# Patient Record
Sex: FEMALE
[De-identification: names, ages, dates, MRNs, and addresses within clinical notes are randomized per-mention and may not be internally consistent; named-entity substitution may affect disease eponyms.]

---

## 2023-02-07 ENCOUNTER — RX ONLY (OUTPATIENT)
Age: 38
Setting detail: RX ONLY
End: 2023-02-07

## 2023-02-10 ENCOUNTER — APPOINTMENT (RX ONLY)
Dept: URBAN - METROPOLITAN AREA CLINIC 153 | Facility: CLINIC | Age: 38
Setting detail: DERMATOLOGY
End: 2023-02-10

## 2023-02-10 PROBLEM — Z41.8 ENCOUNTER FOR OTHER PROCEDURES FOR PURPOSES OTHER THAN REMEDYING HEALTH STATE: Status: ACTIVE | Noted: 2023-02-10

## 2023-02-10 PROCEDURE — ? LASER HAIR REMOVAL

## 2023-02-10 PROCEDURE — ? INVENTORY

## 2023-02-10 NOTE — PROCEDURE: LASER HAIR REMOVAL
Post-Care Instructions: I reviewed with the patient in detail post-care instructions. Patient should avoid sun for a minimum of 4 weeks before and after treatment.
Pulse Duration (Include Units): 40
Location Override: [place]
Anesthesia Type: 1% lidocaine with epinephrine
Spot Size: 5 mm
Treatment Number: 1
Pre-Procedure: Prior to proceeding the treatment areas were cleaned and all present put on their eye protection.
External Cooling Fan Speed: 0
Render Post-Care In The Note: No
Fluence (Will Not Render If 0): 13
Post-Procedure Care: Immediate endpoint: perifollicular erythema and edema. Vaseline and ice applied. Post care reviewed with patient.
Number Of Prepaid Treatments (Will Not Render If 0): 8
Were Eye Shields Employed?: Yes
Fluence (Will Not Render If 0): 14
Fluence (Will Not Render If 0): 12
Detail Level: Detailed
Laser Type: Xeo Kaitlint IPL
Eye Shield Text: Given the treatment area eye shields were inserted prior to treatment.
Shaving (Optional): The patient shaved at home
Consent: Written consent obtained, risks reviewed including but not limited to crusting, scabbing, blistering, scarring, darker or lighter pigmentary change, paradoxical hair regrowth, incomplete removal of hair and infection.

## 2023-02-10 NOTE — HPI: COSMETIC (LASER HAIR REMOVAL)
Have You Had Laser Hair Removal Before?: has not had previous treatment
When Outside In The Sun, Do You...: mildly burns, tans slowly
Additional History: Skin type 2/3

## 2023-03-15 ENCOUNTER — APPOINTMENT (RX ONLY)
Dept: URBAN - METROPOLITAN AREA CLINIC 153 | Facility: CLINIC | Age: 38
Setting detail: DERMATOLOGY
End: 2023-03-15

## 2023-03-15 PROBLEM — Z41.8 ENCOUNTER FOR OTHER PROCEDURES FOR PURPOSES OTHER THAN REMEDYING HEALTH STATE: Status: ACTIVE | Noted: 2023-03-15

## 2023-03-15 PROCEDURE — ? LASER HAIR REMOVAL

## 2023-03-15 PROCEDURE — ? INVENTORY

## 2023-03-15 NOTE — PROCEDURE: LASER HAIR REMOVAL
Laser Type: Xeo Kaitlint IPL
Pulse Duration (Include Units): 40
Location Override: Brazilian
Number Of Prepaid Treatments (Will Not Render If 0): 0
Post-Care Instructions: I reviewed with the patient in detail post-care instructions. Patient should avoid sun for a minimum of 4 weeks before and after treatment.
Shaving (Optional): The patient shaved at home
Eye Shield Text: Given the treatment area eye shields were inserted prior to treatment.
Fluence (Will Not Render If 0): 14
Fluence (Will Not Render If 0): 13
Consent: Written consent obtained, risks reviewed including but not limited to crusting, scabbing, blistering, scarring, darker or lighter pigmentary change, paradoxical hair regrowth, incomplete removal of hair and infection.
Treatment Number: 2
Post-Procedure Care: Immediate endpoint: perifollicular erythema and edema. Vaseline and ice applied. Post care reviewed with patient.
Number Of Prepaid Treatments (Will Not Render If 0): 8
Spot Size: 5 mm
Were Eye Shields Employed?: Yes
Detail Level: Detailed
Anesthesia Type: 1% lidocaine with epinephrine
Pre-Procedure: Prior to proceeding the treatment areas were cleaned and all present put on their eye protection.
Render Post-Care In The Note: No

## 2023-03-15 NOTE — HPI: COSMETIC (LASER HAIR REMOVAL)
previous_has_had_previous_treatment
When Outside In The Sun, Do You...: mildly burns, tans slowly
Resident
When Was Your Last Laser Treatment?: 2/10/23
Number Of Treatments: 1
Additional History: Skin type 2/3
Resident
Resident
Pt

## 2023-03-30 ENCOUNTER — APPOINTMENT (RX ONLY)
Dept: URBAN - METROPOLITAN AREA CLINIC 153 | Facility: CLINIC | Age: 38
Setting detail: DERMATOLOGY
End: 2023-03-30

## 2023-03-30 DIAGNOSIS — Z41.8 ENCOUNTER FOR OTHER PROCEDURES FOR PURPOSES OTHER THAN REMEDYING HEALTH STATE: ICD-10-CM

## 2023-03-30 PROCEDURE — ? LASER TATTOO REMOVAL

## 2023-03-30 PROCEDURE — ? INVENTORY

## 2023-03-30 ASSESSMENT — LOCATION ZONE DERM: LOCATION ZONE: FINGER

## 2023-03-30 ASSESSMENT — LOCATION DETAILED DESCRIPTION DERM: LOCATION DETAILED: LEFT PROXIMAL DORSAL RING FINGER

## 2023-03-30 ASSESSMENT — LOCATION SIMPLE DESCRIPTION DERM: LOCATION SIMPLE: LEFT RING FINGER

## 2023-03-30 NOTE — HPI: COSMETIC (LASER TATTOO REMOVAL)
Have You Had Laser Tattoo Removal Before?: has not had previous treatments
When Outside In The Sun, Do You...: rarely burns, mostly tans
Additional History: Skin type 3

## 2023-03-30 NOTE — PROCEDURE: LASER TATTOO REMOVAL
External Cooling Fan Speed: 0
Energy Percentage (Optional): 2
Post-Care Instructions: I reviewed with the patient in detail post-care instructions. Patient should apply vaseline twice daily to tattoo and keep it covered with a non-stick adhesive dressing.
Laser Type: Q-switched Nd:Yag 532nm
Endpoint Text: Immediate endpoint: skin whitening and pinpoint bleeding.
Treatment Number: 1
Detail Level: Detailed
Tattoo Color Treated: Red
Laser Type: Q-switched Alexandrite 755nm
Pre-Procedure Text: The treatment areas were cleaned and treated with the laser parameters noted above.
Pulse Width (Optional - Include Units): 700J
Anesthesia Type: 1% lidocaine with epinephrine
Post-Procedure Text: Vaseline and ice applied. Post care reviewed with patient.
Consent: Written consent obtained, risks reviewed including but not limited to crusting, scabbing, blistering, scarring, darker or lighter pigmentary change, systemic reactions, ulceration, incidental hair removal, bruising, and/or incomplete removal.
Spot Size In Mm: 3

## 2023-04-27 ENCOUNTER — APPOINTMENT (RX ONLY)
Dept: URBAN - METROPOLITAN AREA CLINIC 153 | Facility: CLINIC | Age: 38
Setting detail: DERMATOLOGY
End: 2023-04-27

## 2023-04-27 DIAGNOSIS — Z41.8 ENCOUNTER FOR OTHER PROCEDURES FOR PURPOSES OTHER THAN REMEDYING HEALTH STATE: ICD-10-CM

## 2023-04-27 PROCEDURE — ? LASER HAIR REMOVAL

## 2023-04-27 PROCEDURE — ? INVENTORY

## 2023-04-27 ASSESSMENT — LOCATION DETAILED DESCRIPTION DERM: LOCATION DETAILED: LEFT SUPRAPUBIC SKIN

## 2023-04-27 ASSESSMENT — LOCATION ZONE DERM: LOCATION ZONE: TRUNK

## 2023-04-27 ASSESSMENT — LOCATION SIMPLE DESCRIPTION DERM: LOCATION SIMPLE: GROIN

## 2023-04-27 NOTE — HPI: COSMETIC (LASER HAIR REMOVAL)
previous_has_had_previous_treatment
When Outside In The Sun, Do You...: rarely burns, tans with ease
When Was Your Last Laser Treatment?: 3/15/23
Number Of Treatments: 2
Additional History: Skin type 2/3

## 2023-04-27 NOTE — PROCEDURE: LASER HAIR REMOVAL
Pulse Duration (Include Units): 40
External Cooling Fan Speed: 0
Pre-Procedure: Prior to proceeding the treatment areas were cleaned and all present put on their eye protection.
Treatment Number: 3
Eye Shield Text: Given the treatment area eye shields were inserted prior to treatment.
Spot Size: 5 mm
Fluence (Will Not Render If 0): 14
Were Eye Shields Employed?: Yes
Post-Procedure Care: Immediate endpoint: perifollicular erythema and edema. Vaseline and ice applied. Post care reviewed with patient.
Number Of Prepaid Treatments (Will Not Render If 0): 8
Consent: Written consent obtained, risks reviewed including but not limited to crusting, scabbing, blistering, scarring, darker or lighter pigmentary change, paradoxical hair regrowth, incomplete removal of hair and infection.
Laser Type: Xeo Kaitlint IPL
Total Area (Optional- Include Units): 1
Post-Care Instructions: I reviewed with the patient in detail post-care instructions. Patient should avoid sun for a minimum of 4 weeks before and after treatment.
Shaving (Optional): The patient shaved at home
Anesthesia Type: 1% lidocaine with epinephrine
Location Override: Brazilian
Render Post-Care In The Note: No
Detail Level: Detailed
Fluence (Will Not Render If 0): 13

## 2023-06-23 ENCOUNTER — APPOINTMENT (RX ONLY)
Dept: URBAN - METROPOLITAN AREA CLINIC 153 | Facility: CLINIC | Age: 38
Setting detail: DERMATOLOGY
End: 2023-06-23

## 2023-06-23 DIAGNOSIS — Z41.8 ENCOUNTER FOR OTHER PROCEDURES FOR PURPOSES OTHER THAN REMEDYING HEALTH STATE: ICD-10-CM

## 2023-06-23 PROCEDURE — ? INVENTORY

## 2023-06-23 PROCEDURE — ? LASER HAIR REMOVAL

## 2023-06-23 NOTE — HPI: COSMETIC (LASER HAIR REMOVAL)
previous_has_had_previous_treatment
When Outside In The Sun, Do You...: mildly burns, tans slowly
When Was Your Last Laser Treatment?: 4/27/23
Number Of Treatments: 3
Additional History: Skin type 2/3

## 2023-06-23 NOTE — PROCEDURE: LASER HAIR REMOVAL
Were Eye Shields Employed?: Yes
Pre-Procedure: Prior to proceeding the treatment areas were cleaned and all present put on their eye protection.
Treatment Number: 4
Anesthesia Type: 1% lidocaine with epinephrine
Spot Size: 5 mm
Pulse Duration (Include Units): 40
Consent: Written consent obtained, risks reviewed including but not limited to crusting, scabbing, blistering, scarring, darker or lighter pigmentary change, paradoxical hair regrowth, incomplete removal of hair and infection.
Post-Procedure Care: Immediate endpoint: perifollicular erythema and edema. Vaseline and ice applied. Post care reviewed with patient.
Detail Level: Detailed
Number Of Prepaid Treatments (Will Not Render If 0): 8
External Cooling Fan Speed: 0
Post-Care Instructions: I reviewed with the patient in detail post-care instructions. Patient should avoid sun for a minimum of 4 weeks before and after treatment.
Eye Shield Text: Given the treatment area eye shields were inserted prior to treatment.
Fluence (Will Not Render If 0): 13
Fluence (Will Not Render If 0): 14
Laser Type: Xeo Kaitlint IPL
Render Post-Care In The Note: No
Fluence (Will Not Render If 0): 15
Shaving (Optional): The patient shaved at home
Location Override: Brazilian

## 2023-10-09 ENCOUNTER — APPOINTMENT (RX ONLY)
Dept: URBAN - METROPOLITAN AREA CLINIC 153 | Facility: CLINIC | Age: 38
Setting detail: DERMATOLOGY
End: 2023-10-09

## 2023-10-09 DIAGNOSIS — L259 CONTACT DERMATITIS AND OTHER ECZEMA, UNSPECIFIED CAUSE: ICD-10-CM

## 2023-10-09 DIAGNOSIS — Z41.8 ENCOUNTER FOR OTHER PROCEDURES FOR PURPOSES OTHER THAN REMEDYING HEALTH STATE: ICD-10-CM

## 2023-10-09 PROBLEM — L23.9 ALLERGIC CONTACT DERMATITIS, UNSPECIFIED CAUSE: Status: ACTIVE | Noted: 2023-10-09

## 2023-10-09 PROCEDURE — ? INVENTORY

## 2023-10-09 PROCEDURE — 99203 OFFICE O/P NEW LOW 30 MIN: CPT

## 2023-10-09 PROCEDURE — ? LASER HAIR REMOVAL

## 2023-10-09 PROCEDURE — ? ADDITIONAL NOTES

## 2023-10-09 PROCEDURE — ? COUNSELING

## 2023-10-09 PROCEDURE — ? PRESCRIPTION

## 2023-10-09 RX ORDER — TRIAMCINOLONE ACETONIDE 0.25 MG/ML
LOTION TOPICAL
Qty: 60 | Refills: 2 | Status: ERX | COMMUNITY
Start: 2023-10-09

## 2023-10-09 RX ADMIN — TRIAMCINOLONE ACETONIDE: 0.25 LOTION TOPICAL at 00:00

## 2023-10-09 ASSESSMENT — LOCATION SIMPLE DESCRIPTION DERM
LOCATION SIMPLE: RIGHT CHEEK
LOCATION SIMPLE: LEFT CHEEK
LOCATION SIMPLE: GROIN
LOCATION SIMPLE: RIGHT THIGH
LOCATION SIMPLE: LEFT FOREHEAD

## 2023-10-09 ASSESSMENT — LOCATION DETAILED DESCRIPTION DERM
LOCATION DETAILED: RIGHT CENTRAL MALAR CHEEK
LOCATION DETAILED: LEFT SUPRAPUBIC SKIN
LOCATION DETAILED: LEFT INFERIOR MEDIAL FOREHEAD
LOCATION DETAILED: LEFT CENTRAL MALAR CHEEK
LOCATION DETAILED: RIGHT ANTERIOR PROXIMAL THIGH

## 2023-10-09 ASSESSMENT — LOCATION ZONE DERM
LOCATION ZONE: TRUNK
LOCATION ZONE: LEG
LOCATION ZONE: FACE

## 2023-10-09 NOTE — PROCEDURE: ADDITIONAL NOTES
Additional Notes:  injected 1.5cc of kenalog 40 on right thigh.
Detail Level: Simple
Render Risk Assessment In Note?: no

## 2023-10-09 NOTE — PROCEDURE: LASER HAIR REMOVAL
External Cooling Fan Speed: 0
Consent: Written consent obtained, risks reviewed including but not limited to crusting, scabbing, blistering, scarring, darker or lighter pigmentary change, paradoxical hair regrowth, incomplete removal of hair and infection.
Were Eye Shields Employed?: Yes
Detail Level: Detailed
Post-Procedure Care: Immediate endpoint: perifollicular erythema and edema. Vaseline and ice applied. Post care reviewed with patient.
Number Of Prepaid Treatments (Will Not Render If 0): 8
Spot Size: 5 mm
Post-Care Instructions: I reviewed with the patient in detail post-care instructions. Patient should avoid sun for a minimum of 4 weeks before and after treatment.
Laser Type: Xeo Kaitlint IPL
Shaving (Optional): The patient shaved at home
Fluence (Will Not Render If 0): 15
Eye Shield Text: Given the treatment area eye shields were inserted prior to treatment.
Pulse Duration (Include Units): 40
Fluence (Will Not Render If 0): 13
Render Post-Care In The Note: No
Fluence (Will Not Render If 0): 10
Location Override: Brazilian
Fluence (Will Not Render If 0): 12
Treatment Number: 5
Pulse Duration (Include Units): 25
Anesthesia Type: 1% lidocaine with epinephrine
Pre-Procedure: Prior to proceeding the treatment areas were cleaned and all present put on their eye protection.

## 2023-10-09 NOTE — HPI: COSMETIC (LASER HAIR REMOVAL)
previous_has_had_previous_treatment
When Outside In The Sun, Do You...: mildly burns, tans slowly
When Was Your Last Laser Treatment?: 6/23/23
Number Of Treatments: 4
Additional History: Skin type 2/3

## 2024-01-29 ENCOUNTER — APPOINTMENT (RX ONLY)
Dept: URBAN - METROPOLITAN AREA CLINIC 153 | Facility: CLINIC | Age: 39
Setting detail: DERMATOLOGY
End: 2024-01-29

## 2024-01-29 DIAGNOSIS — Z41.8 ENCOUNTER FOR OTHER PROCEDURES FOR PURPOSES OTHER THAN REMEDYING HEALTH STATE: ICD-10-CM

## 2024-01-29 PROCEDURE — ? LASER HAIR REMOVAL

## 2024-01-29 PROCEDURE — ? INVENTORY

## 2024-01-29 ASSESSMENT — LOCATION ZONE DERM: LOCATION ZONE: TRUNK

## 2024-01-29 ASSESSMENT — LOCATION DETAILED DESCRIPTION DERM: LOCATION DETAILED: LEFT SUPRAPUBIC SKIN

## 2024-01-29 ASSESSMENT — LOCATION SIMPLE DESCRIPTION DERM: LOCATION SIMPLE: GROIN

## 2024-01-29 NOTE — HPI: COSMETIC (LASER HAIR REMOVAL)
previous_has_had_previous_treatment
When Outside In The Sun, Do You...: always burns, tans with difficulty
When Was Your Last Laser Treatment?: 10/9/23
Number Of Treatments: 5
Additional History: Skin type 2/3

## 2024-01-29 NOTE — PROCEDURE: LASER HAIR REMOVAL
Fluence (Will Not Render If 0): 11
Number Of Prepaid Treatments (Will Not Render If 0): 0
Cooling Override: 15 Celsius
Fluence (Will Not Render If 0): 16
Fluence (Will Not Render If 0): 17
Laser Type: Xeo Kaitlint IPL
Fluence (Will Not Render If 0): 12
Cooling Override: 13 Celsius
Cooling Override: 13 celius
Were Eye Shields Employed?: Yes
Location Override: [place]
Pulse Duration (Include Units): 25
Consent: Written consent obtained, risks reviewed including but not limited to crusting, scabbing, blistering, scarring, darker or lighter pigmentary change, paradoxical hair regrowth, incomplete removal of hair and infection.
Spot Size: 5 mm
Detail Level: Detailed
Total Pulses: 113
Total Pulses (Settings #3): 80
Anesthesia Type: 1% lidocaine with epinephrine
Pre-Procedure: Prior to proceeding the treatment areas were cleaned and all present put on their eye protection.
Treatment Number: 6
Total Pulses (Settings #4): 499
Eye Shield Text: Given the treatment area eye shields were inserted prior to treatment.
Post-Care Instructions: I reviewed with the patient in detail post-care instructions. Patient should avoid sun for a minimum of 4 weeks before and after treatment.
Shaving (Optional): The patient shaved at home
Post-Procedure Care: Immediate endpoint: perifollicular erythema and edema. Vaseline and ice applied. Post care reviewed with patient.
Number Of Prepaid Treatments (Will Not Render If 0): 8
Render Post-Care In The Note: No

## 2024-02-02 ENCOUNTER — APPOINTMENT (RX ONLY)
Dept: URBAN - METROPOLITAN AREA CLINIC 153 | Facility: CLINIC | Age: 39
Setting detail: DERMATOLOGY
End: 2024-02-02

## 2024-02-02 DIAGNOSIS — Z41.8 ENCOUNTER FOR OTHER PROCEDURES FOR PURPOSES OTHER THAN REMEDYING HEALTH STATE: ICD-10-CM

## 2024-02-02 PROCEDURE — ? LASER TATTOO REMOVAL

## 2024-02-02 PROCEDURE — ? INVENTORY

## 2024-02-02 ASSESSMENT — LOCATION ZONE DERM: LOCATION ZONE: FINGER

## 2024-02-02 ASSESSMENT — LOCATION DETAILED DESCRIPTION DERM: LOCATION DETAILED: LEFT PROXIMAL DORSAL RING FINGER

## 2024-02-02 ASSESSMENT — LOCATION SIMPLE DESCRIPTION DERM: LOCATION SIMPLE: LEFT RING FINGER

## 2024-02-02 NOTE — PROCEDURE: LASER TATTOO REMOVAL
Pre-Procedure Text: The treatment areas were cleaned and treated with the laser parameters noted above.
Pulse Width (Optional - Include Units): 900J
Tattoo Color Treated: Red
Spot Size In Mm: 2
Laser Type: Q-switched Alexandrite 755nm
Anesthesia Type: 1% lidocaine with epinephrine
Post-Procedure Text: Vaseline and ice applied. Post care reviewed with patient.
Consent: Written consent obtained, risks reviewed including but not limited to crusting, scabbing, blistering, scarring, darker or lighter pigmentary change, systemic reactions, ulceration, incidental hair removal, bruising, and/or incomplete removal.
Spot Size In Mm: 3
Energy Percentage (Optional): 4
External Cooling Fan Speed: 0
Endpoint Text: Immediate endpoint: skin whitening and pinpoint bleeding.
Post-Care Instructions: I reviewed with the patient in detail post-care instructions. Patient should apply vaseline twice daily to tattoo and keep it covered with a non-stick adhesive dressing.
Laser Type: Q-switched Nd:Yag 532nm
Detail Level: Detailed

## 2024-02-05 ENCOUNTER — APPOINTMENT (RX ONLY)
Dept: URBAN - METROPOLITAN AREA CLINIC 153 | Facility: CLINIC | Age: 39
Setting detail: DERMATOLOGY
End: 2024-02-05

## 2024-02-05 DIAGNOSIS — L23.9 ALLERGIC CONTACT DERMATITIS, UNSPECIFIED CAUSE: ICD-10-CM

## 2024-02-05 PROCEDURE — 95044 PATCH/APPLICATION TESTS: CPT

## 2024-02-05 PROCEDURE — ? PATCH TESTING

## 2024-02-05 PROCEDURE — ? ADDITIONAL NOTES

## 2024-02-05 NOTE — PROCEDURE: PATCH TESTING
Post-Care Instructions: I reviewed with the patient in detail post-care instructions. Patient should not sweat, pick at, or get the patches wet for 48 hours.
Consent: Verbal consent obtained, risks reviewed including but not limited to rash, itching, allergic reaction, systemic rash, remote possibility of anaphylaxis to allergen.
Number Of Patches (Maximum Allowable Per Dos By Cms Is 90): 80
Detail Level: None

## 2024-02-05 NOTE — PROCEDURE: ADDITIONAL NOTES
Detail Level: Simple
Additional Notes: 36 and 55 where not tested due to not being in stock.
Render Risk Assessment In Note?: no

## 2024-02-07 ENCOUNTER — APPOINTMENT (RX ONLY)
Dept: URBAN - METROPOLITAN AREA CLINIC 153 | Facility: CLINIC | Age: 39
Setting detail: DERMATOLOGY
End: 2024-02-07

## 2024-02-07 DIAGNOSIS — L23.9 ALLERGIC CONTACT DERMATITIS, UNSPECIFIED CAUSE: ICD-10-CM

## 2024-02-07 PROCEDURE — ? NORTH AMERICAN 80 PATCH TEST READING

## 2024-02-07 NOTE — PROCEDURE: [place]TH AMER[place] 80 PATCH TEST READING
Allergen 47 Reaction: no reaction
What Reading Time Point?: 48 hour
Name Of Allergen 49: Tea Tree Oil
Name Of Allergen 53: Decyl Glucoside
Name Of Allergen 3: Colophonium / (Colophony)
Name Of Allergen 50: Fragrance Mix II
Name Of Allergen 42: Methyl methacrylate
Name Of Allergen 38: Gold(I)sodium thiosulfate dihydrate
Name Of Allergen 12: Ethylenediamine dihydrochloride
Name Of Allergen 41: Toluenesulfonamide formaldehyde resin
Name Of Allergen 57: Cananga odorata oil / (Ylang-Ylang oil)
Name Of Allergen 29: Glutaral / (Glutaraldehyde)
Name Of Allergen 73: Ethylhexyl Salicylate
Name Of Allergen 26: Paraben Mix
Name Of Allergen 51: Disperse Yellow 3
Name Of Allergen 22: Tocopherol/ (DL alpha tocopherol)
Name Of Allergen 74: Hydroperoxides of Linalool
Name Of Allergen 76: Cocamidopropylbetaine
Name Of Allergen 54: Methylisothiazolinone
Name Of Allergen 47: Triethanolamine
Name Of Allergen 40: Glyceryl monothioglycolate (GMTG)
Show Allergen Counseling In The Note?: No
Name Of Allergen 72: Hydroxyisohexyl 3-CyclohexeneCarboxaldehyde (Lyral)
Allergen 32 Reaction: 2+
Name Of Allergen 31: Sesquiterpene lactone mix
Name Of Allergen 24: Mixed dialkyl thiourea
Name Of Allergen 79: Propylene glycol
Name Of Allergen 39: Ethyl acrylate
Name Of Allergen 48: Textile dye mix
Name Of Allergen 8: Carba mix
Name Of Allergen 36: Ethyleneurea, melamine formaldehyde mix
Name Of Allergen 13: Epoxy resin Bisphenol A
Name Of Allergen 17: N,N-Diphenylguanidine
Name Of Allergen 43: Cobalt(II) chloride hexahydrate
Name Of Allergen 21: Diazolidinylurea (Germall II)
Name Of Allergen 19: Myroxylon pereirae resin / (Balsam [place])
Name Of Allergen 58: Benzyl alcohol
Name Of Allergen 60: Hydroperoxides of Limonene
Name Of Allergen 7: Amerchol L 101
Name Of Allergen 23: Bacitracin
Name Of Allergen 4: P-Phenylenediamine (PPD
Name Of Allergen 66: Compositae Mix II
Name Of Allergen 63: Iodopropynyl butyl carbamate
Name Of Allergen 11: Clobetasol-17-propionate
Name Of Allergen 59: Isopropyl myristate
Name Of Allergen 56: DMDM Hydantoin
Name Of Allergen 62: Polysorbate 80 / (Polyoxyethylenesorbitanmonooleate (Tween 80))
Name Of Allergen 5: Imidazolidinyl urea Germall 115)
Name Of Allergen 18: Potassium dichromate
Name Of Allergen 67: Lidocaine
Name Of Allergen 68: Fusidic acid sodium salt
Name Of Allergen 77: Formaldehyde
Name Of Allergen 55: HEMA (2-Hydroxyethyl methacrylate)
Name Of Allergen 64: 2-n-Octyl-4-isothiazolin-3-one
Name Of Allergen 10: Thiuram mix
Name Of Allergen 52: Benzyl salicylate
Name Of Allergen 32: Thimerosal (Merthiolate)
Show Negative Results In The Note?: Yes
Name Of Allergen 37: 2-tert-Butyl-4-methoxyphenol (BHA)
Name Of Allergen 61: Desoximetasone
Name Of Allergen 44: Tixocortol-21-pivalate
Name Of Allergen 45: B-033A Budesonide
Number Of Patches Read: 80
Name Of Allergen 34: Benzophenone-3 / (2-Hydroxy-4-methoxybenzophenone)
Name Of Allergen 35: Chloroxylenol (PCMX) / (4-Chloro-3,5-xylenol (PCMX))
Name Of Allergen 1: Benzocaine
Name Of Allergen 30: 2-Bromo-2-nitropropane-l,3-diol (Bronopol)
Name Of Allergen 15: 4-gedo-Tqkcykvfzyzobaldggggvml resin
Name Of Allergen 71: Isoamyl-p-methoxycinnamate
Name Of Allergen 16: Mercapto mix
Name Of Allergen 70: Benzoyl Peroxide
Name Of Allergen 20: Nickelsulfate hexahydrate
Name Of Allergen 65: Disperse Blue 106/124 Mix
Name Of Allergen 25: Disperse Orange 3
Name Of Allergen 33: Propolis
Name Of Allergen 9: Neomycin sulfate
Name Of Allergen 2: 2-Mercaptobenzothiazole (MBT)
Name Of Allergen 78: Methylisothiazolinone + Methylchloroisothiazolinone / (Cl+-Meisothiazolinone (Irving CG 200ppm))
Detail Level: Zone
Name Of Allergen 80: Oleamidopropyl Dimethylamine
Name Of Allergen 46: Cocamide FLAKITA / (Coconut diethanolamide)
Name Of Allergen 27: Methyldibromo glutaronitrile (MDBGN)
Name Of Allergen 14: Quaternium-15 (Dowicil 200) / (1-(3-Chloroallyl)-3,5,5-verfpp-5-azoniaadamantane chloride)
Name Of Allergen 28: Fragrance mix
Name Of Allergen 69: Dibucaine hydrochloride
Name Of Allergen 6: Cinnamal / (Cinnamic aldehyde)
Name Of Allergen 75: Amidoamine

## 2024-02-09 ENCOUNTER — APPOINTMENT (RX ONLY)
Dept: URBAN - METROPOLITAN AREA CLINIC 153 | Facility: CLINIC | Age: 39
Setting detail: DERMATOLOGY
End: 2024-02-09

## 2024-02-09 DIAGNOSIS — L23.9 ALLERGIC CONTACT DERMATITIS, UNSPECIFIED CAUSE: ICD-10-CM

## 2024-02-09 PROCEDURE — ? INTRALESIONAL KENALOG

## 2024-02-09 PROCEDURE — ? NORTH AMERICAN 80 PATCH TEST READING

## 2024-02-09 PROCEDURE — 11900 INJECT SKIN LESIONS </W 7: CPT

## 2024-02-09 ASSESSMENT — LOCATION SIMPLE DESCRIPTION DERM: LOCATION SIMPLE: LEFT THIGH

## 2024-02-09 ASSESSMENT — LOCATION ZONE DERM: LOCATION ZONE: LEG

## 2024-02-09 ASSESSMENT — LOCATION DETAILED DESCRIPTION DERM: LOCATION DETAILED: LEFT ANTERIOR DISTAL THIGH

## 2024-02-09 NOTE — PROCEDURE: INTRALESIONAL KENALOG
Include Z78.9 (Other Specified Conditions Influencing Health Status) As An Associated Diagnosis?: No
How Many Mls Were Removed From The 80 Mg/Ml (5ml) Vial When Preparing The Injectable Solution?: 0
Concentration Of Kenalog Solution Injected (Mg/Ml): 3.0
Expiration Date For Kenalog (Optional): Jan 2026
Detail Level: Detailed
Show Inventory Tab: Hide
Consent: The risks of atrophy were reviewed with the patient.
Total Volume (Ccs): 1.5
Administered By (Optional): macy
Lot # For Kenalog (Optional): [id number]
Medical Necessity Clause: This procedure was medically necessary because the lesions that were treated were:
Kenalog Preparation: Kenalog
Ndc# For Kenalog Only: 7634-4994-35
Kenalog Type Of Vial: Multiple Dose
Validate Note Data When Using Inventory: Yes

## 2024-02-09 NOTE — PROCEDURE: [place] 80 PATCH TEST READING
Allergen 47 Reaction: no reaction
What Reading Time Point?: 48 hour
Name Of Allergen 49: Tea Tree Oil
Name Of Allergen 53: Decyl Glucoside
Name Of Allergen 3: Colophonium / (Colophony)
Name Of Allergen 50: Fragrance Mix II
Name Of Allergen 42: Methyl methacrylate
Name Of Allergen 38: Gold(I)sodium thiosulfate dihydrate
Name Of Allergen 12: Ethylenediamine dihydrochloride
Name Of Allergen 41: Toluenesulfonamide formaldehyde resin
Name Of Allergen 57: Cananga odorata oil / (Ylang-Ylang oil)
Name Of Allergen 29: Glutaral / (Glutaraldehyde)
Name Of Allergen 73: Ethylhexyl Salicylate
Name Of Allergen 26: Paraben Mix
Name Of Allergen 51: Disperse Yellow 3
Name Of Allergen 22: Tocopherol/ (DL alpha tocopherol)
Name Of Allergen 74: Hydroperoxides of Linalool
Name Of Allergen 76: Cocamidopropylbetaine
Name Of Allergen 54: Methylisothiazolinone
Name Of Allergen 47: Triethanolamine
Name Of Allergen 40: Glyceryl monothioglycolate (GMTG)
Show Allergen Counseling In The Note?: No
Name Of Allergen 72: Hydroxyisohexyl 3-CyclohexeneCarboxaldehyde (Lyral)
Allergen 32 Reaction: 2+
Name Of Allergen 31: Sesquiterpene lactone mix
Name Of Allergen 24: Mixed dialkyl thiourea
Name Of Allergen 79: Propylene glycol
Name Of Allergen 39: Ethyl acrylate
Name Of Allergen 48: Textile dye mix
Name Of Allergen 8: Carba mix
Allergen 20 Reaction: 1+
Name Of Allergen 36: Ethyleneurea, melamine formaldehyde mix
Name Of Allergen 13: Epoxy resin Bisphenol A
Name Of Allergen 17: N,N-Diphenylguanidine
Name Of Allergen 43: Cobalt(II) chloride hexahydrate
Name Of Allergen 21: Diazolidinylurea (Germall II)
Name Of Allergen 19: Myroxylon pereirae resin / (Balsam [place])
Name Of Allergen 58: Benzyl alcohol
Name Of Allergen 60: Hydroperoxides of Limonene
Name Of Allergen 7: Amerchol L 101
Name Of Allergen 23: Bacitracin
Name Of Allergen 4: P-Phenylenediamine (PPD
Name Of Allergen 66: Compositae Mix II
Name Of Allergen 63: Iodopropynyl butyl carbamate
Name Of Allergen 11: Clobetasol-17-propionate
Name Of Allergen 59: Isopropyl myristate
Name Of Allergen 56: DMDM Hydantoin
Name Of Allergen 62: Polysorbate 80 / (Polyoxyethylenesorbitanmonooleate (Tween 80))
Name Of Allergen 5: Imidazolidinyl urea Germall 115)
Name Of Allergen 18: Potassium dichromate
Name Of Allergen 67: Lidocaine
Name Of Allergen 68: Fusidic acid sodium salt
Name Of Allergen 77: Formaldehyde
Name Of Allergen 55: HEMA (2-Hydroxyethyl methacrylate)
Name Of Allergen 64: 2-n-Octyl-4-isothiazolin-3-one
Name Of Allergen 10: Thiuram mix
Name Of Allergen 52: Benzyl salicylate
Name Of Allergen 32: Thimerosal (Merthiolate)
Show Negative Results In The Note?: Yes
Name Of Allergen 37: 2-tert-Butyl-4-methoxyphenol (BHA)
Name Of Allergen 61: Desoximetasone
Name Of Allergen 44: Tixocortol-21-pivalate
Name Of Allergen 45: B-033A Budesonide
Number Of Patches Read: 80
Name Of Allergen 34: Benzophenone-3 / (2-Hydroxy-4-methoxybenzophenone)
Name Of Allergen 35: Chloroxylenol (PCMX) / (4-Chloro-3,5-xylenol (PCMX))
Name Of Allergen 1: Benzocaine
Name Of Allergen 30: 2-Bromo-2-nitropropane-l,3-diol (Bronopol)
Name Of Allergen 15: 0-mamq-Xcjruylucdlvbzoesiafrtj resin
Name Of Allergen 71: Isoamyl-p-methoxycinnamate
Name Of Allergen 16: Mercapto mix
Name Of Allergen 70: Benzoyl Peroxide
Name Of Allergen 20: Nickelsulfate hexahydrate
Name Of Allergen 65: Disperse Blue 106/124 Mix
Name Of Allergen 25: Disperse Orange 3
Name Of Allergen 33: Propolis
Name Of Allergen 9: Neomycin sulfate
Name Of Allergen 2: 2-Mercaptobenzothiazole (MBT)
Name Of Allergen 78: Methylisothiazolinone + Methylchloroisothiazolinone / (Cl+-Meisothiazolinone (Irving CG 200ppm))
Detail Level: Zone
Name Of Allergen 80: Oleamidopropyl Dimethylamine
Name Of Allergen 46: Cocamide FLKAITA / (Coconut diethanolamide)
Name Of Allergen 27: Methyldibromo glutaronitrile (MDBGN)
Name Of Allergen 14: Quaternium-15 (Dowicil 200) / (1-(3-Chloroallyl)-3,5,9-btfcag-4-azoniaadamantane chloride)
Name Of Allergen 28: Fragrance mix
Name Of Allergen 69: Dibucaine hydrochloride
Name Of Allergen 6: Cinnamal / (Cinnamic aldehyde)
Name Of Allergen 75: Amidoamine

## 2024-03-15 ENCOUNTER — APPOINTMENT (RX ONLY)
Dept: URBAN - METROPOLITAN AREA CLINIC 153 | Facility: CLINIC | Age: 39
Setting detail: DERMATOLOGY
End: 2024-03-15

## 2024-03-15 DIAGNOSIS — Z41.8 ENCOUNTER FOR OTHER PROCEDURES FOR PURPOSES OTHER THAN REMEDYING HEALTH STATE: ICD-10-CM

## 2024-03-15 PROCEDURE — ? LASER HAIR REMOVAL

## 2024-03-15 PROCEDURE — ? INVENTORY

## 2024-03-15 ASSESSMENT — LOCATION ZONE DERM: LOCATION ZONE: TRUNK

## 2024-03-15 ASSESSMENT — LOCATION DETAILED DESCRIPTION DERM: LOCATION DETAILED: LEFT SUPRAPUBIC SKIN

## 2024-03-15 ASSESSMENT — LOCATION SIMPLE DESCRIPTION DERM: LOCATION SIMPLE: GROIN

## 2024-03-15 NOTE — PROCEDURE: LASER HAIR REMOVAL
Total Pulses (Settings #4): 499
Pulse Duration (Include Units): 20
Spot Size: 5 mm
Post-Care Instructions: I reviewed with the patient in detail post-care instructions. Patient should avoid sun for a minimum of 4 weeks before and after treatment.
Total Pulses (Settings #3): 80
Location Override: [place]
Pulse Duration (Include Units): 25
Laser Type: Xeo Kaitlint IPL
Detail Level: Detailed
Render Post-Care In The Note: No
Total Pulses: 17
Treatment Number: 0
Total Pulses: 110
Pre-Procedure: Prior to proceeding the treatment areas were cleaned and all present put on their eye protection.
Treatment Number: 7
Shaving (Optional): The patient shaved at home
Eye Shield Text: Given the treatment area eye shields were inserted prior to treatment.
Fluence (Will Not Render If 0): 16
Cooling Override: 15 Celsius
Fluence (Will Not Render If 0): 12
Post-Procedure Care: Immediate endpoint: perifollicular erythema and edema. Vaseline and ice applied. Post care reviewed with patient.
Anesthesia Type: 1% lidocaine with epinephrine
Number Of Prepaid Treatments (Will Not Render If 0): 8
Cooling Override: 13 celius
Consent: Written consent obtained, risks reviewed including but not limited to crusting, scabbing, blistering, scarring, darker or lighter pigmentary change, paradoxical hair regrowth, incomplete removal of hair and infection.
Cooling Override: 13 Celsius
Were Eye Shields Employed?: Yes

## 2024-03-15 NOTE — HPI: COSMETIC (LASER HAIR REMOVAL)
previous_has_had_previous_treatment
When Outside In The Sun, Do You...: rarely burns, tans with ease
When Was Your Last Laser Treatment?: 1/29/24
Number Of Treatments: 6
Additional History: Skin type 2/3